# Patient Record
Sex: FEMALE | Race: WHITE | NOT HISPANIC OR LATINO | Employment: OTHER | URBAN - METROPOLITAN AREA
[De-identification: names, ages, dates, MRNs, and addresses within clinical notes are randomized per-mention and may not be internally consistent; named-entity substitution may affect disease eponyms.]

---

## 2024-10-21 ENCOUNTER — APPOINTMENT (OUTPATIENT)
Dept: GENERAL RADIOLOGY | Facility: HOSPITAL | Age: 84
End: 2024-10-21
Payer: MEDICARE

## 2024-10-21 LAB
BACTERIA UR QL AUTO: ABNORMAL /HPF
BASOPHILS # BLD AUTO: 0.03 10*3/MM3 (ref 0–0.2)
BASOPHILS NFR BLD AUTO: 0.4 % (ref 0–1.5)
BILIRUB UR QL STRIP: NEGATIVE
CLARITY UR: ABNORMAL
COLOR UR: YELLOW
DEPRECATED RDW RBC AUTO: 43.5 FL (ref 37–54)
EOSINOPHIL # BLD AUTO: 0.08 10*3/MM3 (ref 0–0.4)
EOSINOPHIL NFR BLD AUTO: 1 % (ref 0.3–6.2)
ERYTHROCYTE [DISTWIDTH] IN BLOOD BY AUTOMATED COUNT: 12.4 % (ref 12.3–15.4)
GLUCOSE UR STRIP-MCNC: NEGATIVE MG/DL
HCT VFR BLD AUTO: 41.4 % (ref 34–46.6)
HGB BLD-MCNC: 13.9 G/DL (ref 12–15.9)
HGB UR QL STRIP.AUTO: NEGATIVE
HYALINE CASTS UR QL AUTO: ABNORMAL /LPF
IMM GRANULOCYTES # BLD AUTO: 0.03 10*3/MM3 (ref 0–0.05)
IMM GRANULOCYTES NFR BLD AUTO: 0.4 % (ref 0–0.5)
KETONES UR QL STRIP: ABNORMAL
LEUKOCYTE ESTERASE UR QL STRIP.AUTO: ABNORMAL
LYMPHOCYTES # BLD AUTO: 1.9 10*3/MM3 (ref 0.7–3.1)
LYMPHOCYTES NFR BLD AUTO: 22.9 % (ref 19.6–45.3)
MCH RBC QN AUTO: 31.6 PG (ref 26.6–33)
MCHC RBC AUTO-ENTMCNC: 33.6 G/DL (ref 31.5–35.7)
MCV RBC AUTO: 94.1 FL (ref 79–97)
MONOCYTES # BLD AUTO: 0.78 10*3/MM3 (ref 0.1–0.9)
MONOCYTES NFR BLD AUTO: 9.4 % (ref 5–12)
NEUTROPHILS NFR BLD AUTO: 5.48 10*3/MM3 (ref 1.7–7)
NEUTROPHILS NFR BLD AUTO: 65.9 % (ref 42.7–76)
NITRITE UR QL STRIP: NEGATIVE
NRBC BLD AUTO-RTO: 0 /100 WBC (ref 0–0.2)
PH UR STRIP.AUTO: 5.5 [PH] (ref 5–8)
PLATELET # BLD AUTO: 227 10*3/MM3 (ref 140–450)
PMV BLD AUTO: 11.6 FL (ref 6–12)
PROT UR QL STRIP: ABNORMAL
RBC # BLD AUTO: 4.4 10*6/MM3 (ref 3.77–5.28)
RBC # UR STRIP: ABNORMAL /HPF
REF LAB TEST METHOD: ABNORMAL
SP GR UR STRIP: 1.02 (ref 1–1.03)
SQUAMOUS #/AREA URNS HPF: ABNORMAL /HPF
UROBILINOGEN UR QL STRIP: ABNORMAL
WBC # UR STRIP: ABNORMAL /HPF
WBC NRBC COR # BLD AUTO: 8.3 10*3/MM3 (ref 3.4–10.8)

## 2024-10-21 PROCEDURE — 84484 ASSAY OF TROPONIN QUANT: CPT | Performed by: PHYSICIAN ASSISTANT

## 2024-10-21 PROCEDURE — 81001 URINALYSIS AUTO W/SCOPE: CPT | Performed by: PHYSICIAN ASSISTANT

## 2024-10-21 PROCEDURE — 83735 ASSAY OF MAGNESIUM: CPT | Performed by: PHYSICIAN ASSISTANT

## 2024-10-21 PROCEDURE — 84145 PROCALCITONIN (PCT): CPT | Performed by: PHYSICIAN ASSISTANT

## 2024-10-21 PROCEDURE — 82550 ASSAY OF CK (CPK): CPT | Performed by: PHYSICIAN ASSISTANT

## 2024-10-21 PROCEDURE — 93005 ELECTROCARDIOGRAM TRACING: CPT | Performed by: PHYSICIAN ASSISTANT

## 2024-10-21 PROCEDURE — 85025 COMPLETE CBC W/AUTO DIFF WBC: CPT | Performed by: PHYSICIAN ASSISTANT

## 2024-10-21 PROCEDURE — 83690 ASSAY OF LIPASE: CPT | Performed by: PHYSICIAN ASSISTANT

## 2024-10-21 PROCEDURE — 99285 EMERGENCY DEPT VISIT HI MDM: CPT

## 2024-10-21 PROCEDURE — 71045 X-RAY EXAM CHEST 1 VIEW: CPT

## 2024-10-21 PROCEDURE — 80053 COMPREHEN METABOLIC PANEL: CPT | Performed by: PHYSICIAN ASSISTANT

## 2024-10-21 PROCEDURE — 83605 ASSAY OF LACTIC ACID: CPT | Performed by: PHYSICIAN ASSISTANT

## 2024-10-21 PROCEDURE — 0202U NFCT DS 22 TRGT SARS-COV-2: CPT | Performed by: PHYSICIAN ASSISTANT

## 2024-10-21 RX ORDER — METOCLOPRAMIDE HYDROCHLORIDE 5 MG/ML
5 INJECTION INTRAMUSCULAR; INTRAVENOUS ONCE
Status: COMPLETED | OUTPATIENT
Start: 2024-10-21 | End: 2024-10-22

## 2024-10-21 RX ORDER — SODIUM CHLORIDE 0.9 % (FLUSH) 0.9 %
10 SYRINGE (ML) INJECTION AS NEEDED
Status: DISCONTINUED | OUTPATIENT
Start: 2024-10-21 | End: 2024-10-22 | Stop reason: HOSPADM

## 2024-10-21 RX ORDER — FAMOTIDINE 10 MG/ML
20 INJECTION, SOLUTION INTRAVENOUS ONCE
Status: COMPLETED | OUTPATIENT
Start: 2024-10-21 | End: 2024-10-22

## 2024-10-22 ENCOUNTER — HOSPITAL ENCOUNTER (EMERGENCY)
Facility: HOSPITAL | Age: 84
Discharge: HOME | End: 2024-10-22
Attending: EMERGENCY MEDICINE | Admitting: EMERGENCY MEDICINE
Payer: MEDICARE

## 2024-10-22 ENCOUNTER — APPOINTMENT (OUTPATIENT)
Dept: CT IMAGING | Facility: HOSPITAL | Age: 84
End: 2024-10-22
Payer: MEDICARE

## 2024-10-22 VITALS
SYSTOLIC BLOOD PRESSURE: 169 MMHG | TEMPERATURE: 98 F | RESPIRATION RATE: 18 BRPM | OXYGEN SATURATION: 97 % | BODY MASS INDEX: 26.96 KG/M2 | WEIGHT: 182 LBS | HEART RATE: 62 BPM | DIASTOLIC BLOOD PRESSURE: 83 MMHG | HEIGHT: 69 IN

## 2024-10-22 DIAGNOSIS — J12.9 VIRAL PNEUMONIA: ICD-10-CM

## 2024-10-22 DIAGNOSIS — R53.81 MALAISE AND FATIGUE: ICD-10-CM

## 2024-10-22 DIAGNOSIS — Z86.19 HISTORY OF HELICOBACTER PYLORI INFECTION: ICD-10-CM

## 2024-10-22 DIAGNOSIS — U07.1 COVID-19 VIRUS INFECTION: ICD-10-CM

## 2024-10-22 DIAGNOSIS — R53.1 GENERALIZED WEAKNESS: Primary | ICD-10-CM

## 2024-10-22 DIAGNOSIS — R53.83 MALAISE AND FATIGUE: ICD-10-CM

## 2024-10-22 DIAGNOSIS — R11.0 NAUSEA: ICD-10-CM

## 2024-10-22 LAB
ALBUMIN SERPL-MCNC: 4.1 G/DL (ref 3.5–5.2)
ALBUMIN/GLOB SERPL: 1.3 G/DL
ALP SERPL-CCNC: 91 U/L (ref 39–117)
ALT SERPL W P-5'-P-CCNC: 20 U/L (ref 1–33)
ANION GAP SERPL CALCULATED.3IONS-SCNC: 13 MMOL/L (ref 5–15)
AST SERPL-CCNC: 36 U/L (ref 1–32)
B PARAPERT DNA SPEC QL NAA+PROBE: NOT DETECTED
B PERT DNA SPEC QL NAA+PROBE: NOT DETECTED
BILIRUB SERPL-MCNC: 0.7 MG/DL (ref 0–1.2)
BUN SERPL-MCNC: 11 MG/DL (ref 8–23)
BUN/CREAT SERPL: 12.5 (ref 7–25)
C PNEUM DNA NPH QL NAA+NON-PROBE: NOT DETECTED
CALCIUM SPEC-SCNC: 9.6 MG/DL (ref 8.6–10.5)
CHLORIDE SERPL-SCNC: 106 MMOL/L (ref 98–107)
CK SERPL-CCNC: 106 U/L (ref 20–180)
CO2 SERPL-SCNC: 25 MMOL/L (ref 22–29)
CREAT SERPL-MCNC: 0.88 MG/DL (ref 0.57–1)
D-LACTATE SERPL-SCNC: 1.4 MMOL/L (ref 0.5–2)
EGFRCR SERPLBLD CKD-EPI 2021: 64.5 ML/MIN/1.73
FLUAV SUBTYP SPEC NAA+PROBE: NOT DETECTED
FLUBV RNA ISLT QL NAA+PROBE: NOT DETECTED
GLOBULIN UR ELPH-MCNC: 3.2 GM/DL
GLUCOSE SERPL-MCNC: 157 MG/DL (ref 65–99)
HADV DNA SPEC NAA+PROBE: NOT DETECTED
HCOV 229E RNA SPEC QL NAA+PROBE: NOT DETECTED
HCOV HKU1 RNA SPEC QL NAA+PROBE: NOT DETECTED
HCOV NL63 RNA SPEC QL NAA+PROBE: NOT DETECTED
HCOV OC43 RNA SPEC QL NAA+PROBE: NOT DETECTED
HMPV RNA NPH QL NAA+NON-PROBE: NOT DETECTED
HPIV1 RNA ISLT QL NAA+PROBE: NOT DETECTED
HPIV2 RNA SPEC QL NAA+PROBE: NOT DETECTED
HPIV3 RNA NPH QL NAA+PROBE: NOT DETECTED
HPIV4 P GENE NPH QL NAA+PROBE: NOT DETECTED
LIPASE SERPL-CCNC: 24 U/L (ref 13–60)
M PNEUMO IGG SER IA-ACNC: NOT DETECTED
MAGNESIUM SERPL-MCNC: 1.8 MG/DL (ref 1.6–2.4)
POTASSIUM SERPL-SCNC: 3.6 MMOL/L (ref 3.5–5.2)
PROCALCITONIN SERPL-MCNC: 0.04 NG/ML (ref 0–0.25)
PROT SERPL-MCNC: 7.3 G/DL (ref 6–8.5)
RHINOVIRUS RNA SPEC NAA+PROBE: NOT DETECTED
RSV RNA NPH QL NAA+NON-PROBE: NOT DETECTED
SARS-COV-2 RNA NPH QL NAA+NON-PROBE: DETECTED
SODIUM SERPL-SCNC: 144 MMOL/L (ref 136–145)
TROPONIN T SERPL HS-MCNC: 28 NG/L

## 2024-10-22 PROCEDURE — 25010000002 METOCLOPRAMIDE PER 10 MG: Performed by: PHYSICIAN ASSISTANT

## 2024-10-22 PROCEDURE — 71275 CT ANGIOGRAPHY CHEST: CPT

## 2024-10-22 PROCEDURE — 96375 TX/PRO/DX INJ NEW DRUG ADDON: CPT

## 2024-10-22 PROCEDURE — 25510000001 IOPAMIDOL PER 1 ML: Performed by: EMERGENCY MEDICINE

## 2024-10-22 PROCEDURE — 25810000003 SODIUM CHLORIDE 0.9 % SOLUTION: Performed by: PHYSICIAN ASSISTANT

## 2024-10-22 PROCEDURE — 96374 THER/PROPH/DIAG INJ IV PUSH: CPT

## 2024-10-22 RX ORDER — IOPAMIDOL 755 MG/ML
75 INJECTION, SOLUTION INTRAVASCULAR
Status: COMPLETED | OUTPATIENT
Start: 2024-10-22 | End: 2024-10-22

## 2024-10-22 RX ORDER — PROCHLORPERAZINE MALEATE 5 MG
5 TABLET ORAL EVERY 6 HOURS PRN
Qty: 15 TABLET | Refills: 0 | Status: SHIPPED | OUTPATIENT
Start: 2024-10-22

## 2024-10-22 RX ADMIN — FAMOTIDINE 20 MG: 10 INJECTION, SOLUTION INTRAVENOUS at 01:48

## 2024-10-22 RX ADMIN — METOCLOPRAMIDE 5 MG: 5 INJECTION, SOLUTION INTRAMUSCULAR; INTRAVENOUS at 01:50

## 2024-10-22 RX ADMIN — SODIUM CHLORIDE 1000 ML: 9 INJECTION, SOLUTION INTRAVENOUS at 01:42

## 2024-10-22 RX ADMIN — IOPAMIDOL 75 ML: 755 INJECTION, SOLUTION INTRAVENOUS at 00:57

## 2024-10-22 NOTE — ED PROVIDER NOTES
Subjective   History of Present Illness  This is a 84-year-old female that presents to the ER with family for weakness and malaise/fatigue and persistent nausea.  Patient has been seen at [facility] in [place], [place], at least twice since 10/7/24.  She was initially taken there per EMS after she was found in the floor after a fall in the bathroom.  She was diagnosed with COVID-19 as well as a UTI.  She was discharged home and prescribed Paxlovid and Keflex.  Patient took 2 days of Keflex but discontinued it due to nausea, and a family member told her not to take Paxlovid, so she discontinued that after 1 dose.  Patient denies any dysuria, urgency, or frequency.  She did have significant diarrhea early on during the COVID-19 viral infection.  She was seen at [facility], as well, on 10/19/24 for diarrhea and nausea.  CT of the abdomen/pelvis with contrast revealed no acute process.  Patient continues to have nausea and anorexia.  She has not any vomiting.  Last bowel movement was 3 days ago and it was a large amount of diarrhea.  Patient has personal history of H. pylori, and she feels that the nausea and upper abdominal discomfort feels similar to when she had H. pylori in the past.  Patient denies any daily aspirin use or frequent NSAID use or chronic anticoagulation.  Past medical history is essentially negative.  Grandson at the bedside says that Zofran is not helping with nausea.  No other concerns at this time.    History provided by:  Patient, medical records and relative  Weakness - Generalized  Duration:  2 weeks  Timing:  Constant  Chronicity:  Recurrent  Context comment:  Flu-like sxs on 10/5/24 and generally weak and seen at [facility] in [place], [place], on 10/7/24. Dx with Covid-19 and UTI. Rx for Paxlovid and Keflex, but she couldn't take meds.  Relieved by:  Nothing  Worsened by:  Nothing  Ineffective treatments:  Drinking fluids  Associated symptoms: cough (Mild NP cough), diarrhea (Last  stool was large and loose 3 days ago.) and lethargy (No falls since 10/7/24.)    Associated symptoms: no abdominal pain, no anorexia, no arthralgias, no chest pain, no difficulty walking, no dizziness, no dysuria, no falls, no fever, no foul-smelling urine, no frequency, no headaches, no hematochezia, no melena, no nausea, no shortness of breath, no stroke symptoms, no syncope, no urgency and no vomiting    Risk factors comment:  Pt took Keflex for UTI for 1 wk, and couldn't tolerate anymore. Recent evaluation at [facility] 2 days ago and UTI was improved.      Review of Systems   Constitutional:  Positive for activity change, appetite change and fatigue. Negative for chills and fever.   HENT: Negative.  Negative for congestion, ear pain, postnasal drip, rhinorrhea, sinus pressure, sinus pain, sneezing and sore throat.         Diagnosis of COVID-19 on 10/7/2024.  No previous personal history of COVID-19 and patient is not vaccinated for COVID-19.   Respiratory:  Positive for cough (Mild NP cough). Negative for shortness of breath.    Cardiovascular: Negative.  Negative for chest pain, palpitations, leg swelling and syncope.   Gastrointestinal:  Positive for diarrhea (Last stool was large and loose 3 days ago.). Negative for abdominal pain, anorexia, constipation, hematochezia, melena, nausea and vomiting.   Genitourinary:  Negative for decreased urine volume, dysuria, frequency and urgency.        Recent UTI diagnosed on 10/7/2024.  Patient took 2 days of Keflex.  No previous abnormal urine cultures in epic.  Patient denies any dysuria, urgency, or frequency at present.   Musculoskeletal:  Positive for gait problem (secondary to weakness.  No recent falls). Negative for arthralgias, back pain and falls.   Neurological:  Positive for weakness. Negative for dizziness, syncope, facial asymmetry, speech difficulty and headaches.   All other systems reviewed and are negative.      No past medical history on file.    No Known  Allergies    No past surgical history on file.    No family history on file.    Social History     Socioeconomic History    Marital status:            Objective   Physical Exam  Vitals and nursing note reviewed.   Constitutional:       General: She is not in acute distress.     Appearance: Normal appearance. She is not ill-appearing, toxic-appearing or diaphoretic.      Comments: No acute sign of pain or distress.  Nontoxic.  Generally weak with malaise/fatigue.   HENT:      Head: Normocephalic and atraumatic.      Nose: Nose normal. No congestion or rhinorrhea.      Mouth/Throat:      Mouth: Mucous membranes are moist.      Pharynx: Oropharynx is clear. No pharyngeal swelling, oropharyngeal exudate, posterior oropharyngeal erythema or uvula swelling.      Comments: Oral mucous membranes are moist.  Posterior pharynx is not erythematous  Eyes:      Extraocular Movements: Extraocular movements intact.      Conjunctiva/sclera: Conjunctivae normal.      Pupils: Pupils are equal, round, and reactive to light.   Cardiovascular:      Rate and Rhythm: Normal rate and regular rhythm. No extrasystoles are present.     Pulses: Normal pulses.      Heart sounds: Normal heart sounds.      Comments: Regular rate and rhythm.  No ectopy.  No pedal edema to lower extremities  Pulmonary:      Effort: Pulmonary effort is normal. No tachypnea, accessory muscle usage, respiratory distress or retractions.      Breath sounds: Normal breath sounds. No decreased breath sounds, wheezing or rhonchi.      Comments: Regular respiratory effort.  No cough noted on exam.  Good air exchange bilateral lung fields.  No decreased breath sounds concerning for consolidation and no pleuritic chest pain elicited with deep inspiration.  Abdominal:      General: Bowel sounds are normal. There is no distension.      Palpations: Abdomen is soft.      Tenderness: There is no abdominal tenderness. There is no right CVA tenderness, left CVA tenderness,  guarding or rebound.      Comments: Abdomen soft without distention.  Active bowel sounds all 4 quadrants.  Nontender to palpation.  No flank or CVA tenderness.  Abdominal exam is benign.   Musculoskeletal:         General: Normal range of motion.      Cervical back: Normal range of motion and neck supple.      Right lower leg: No edema.      Left lower leg: No edema.   Skin:     General: Skin is warm and dry.   Neurological:      General: No focal deficit present.      Mental Status: She is alert and oriented to person, place, and time.      Cranial Nerves: Cranial nerves 2-12 are intact.      Sensory: Sensation is intact.      Motor: Motor function is intact.      Coordination: Coordination is intact.      Comments: Alert and oriented x 3.  Generally weak with malaise/fatigue.  No focal neurologic deficits.   Psychiatric:         Mood and Affect: Mood and affect normal.         Speech: Speech normal.         Behavior: Behavior normal. Behavior is cooperative.         Thought Content: Thought content normal.         Cognition and Memory: Cognition and memory normal.      Comments: Normal mood and affect         Procedures           ED Course  ED Course as of 10/22/24 0331   Mon Oct 21, 2024   2050 Pt had H. Pylori approximately 10 years ago. She says it feels like she has it now. Last colonoscopy was in [place] was greater than 10 years ago. Diagnosed with Covid-19 in [place] ER on 10/7/24. No previous Covid-19 viral infection. Non-vaccinated for Covid-19. [FC]   Tue Oct 22, 2024   0328 I personally interpreted EKG which showed sinus bradycardia.  There was no acute ST changes consistent with ischemia.  High-sensitivity troponin was 28.  Patient denies any chest pain or shortness of breath.  CBC and chemistries were within normal limits.  Urinalysis revealed 21-50 white blood cells but no bacteria and negative nitrite.  Respiratory PCR panel continues to test positive for COVID-19 with diagnosis on 10/7/2024.   Lactic acid and procalcitonin were normal.  Lipase is 24.  Magnesium is 1.8.  We proceeded with CTA of the chest with contrast which reveals no evidence of PE.  Patient has diffuse patchy areas of airspace disease in both lungs primarily at the lung periphery compatible with multifocal pneumonia but appearance would suggest viral or atypical etiology.  Sats are stable on room air at 97 to 99%.  Patient given IV fluid bolus, Reglan, and Pepcid.  I did order H. pylori stool antigen, but patient unable to provide stool specimen.  We will refer patient to our GI clinic and recommend further evaluation for recurrent nausea and history of H. pylori.  Family requested new prescription for nausea instead of Zofran because it is not helping.  Rx for Compazine 5 mg by mouth every 6 hours as needed for nausea.  Encourage fluids.  ER workup was reassuring.  We gave phone number for patient connection since patient does not have a PCP.  Call the phone number and find out accepting physicians in the [place] area.  Reviewed the case and all diagnostic workup with Dr. Darnell, ER attending physician.  Return to the ER if worsening symptoms. [FC]      ED Course User Index  [FC] Carey Jacobson, ROBIN                                   Recent Results (from the past 24 hours)   Urinalysis With Microscopic If Indicated (No Culture) - Urine, Clean Catch    Collection Time: 10/21/24 11:17 PM    Specimen: Urine, Clean Catch   Result Value Ref Range    Color, UA Yellow Yellow, Straw    Appearance, UA Cloudy (A) Clear    pH, UA 5.5 5.0 - 8.0    Specific Gravity, UA 1.022 1.001 - 1.030    Glucose, UA Negative Negative    Ketones, UA Trace (A) Negative    Bilirubin, UA Negative Negative    Blood, UA Negative Negative    Protein, UA Trace (A) Negative    Leuk Esterase, UA Moderate (2+) (A) Negative    Nitrite, UA Negative Negative    Urobilinogen, UA 1.0 E.U./dL 0.2 - 1.0 E.U./dL   Urinalysis, Microscopic Only - Urine, Clean Catch     Collection Time: 10/21/24 11:17 PM    Specimen: Urine, Clean Catch   Result Value Ref Range    RBC, UA 0-2 None Seen, 0-2 /HPF    WBC, UA 21-50 (A) None Seen, 0-2 /HPF    Bacteria, UA None Seen None Seen, Trace /HPF    Squamous Epithelial Cells, UA 3-6 (A) None Seen, 0-2 /HPF    Hyaline Casts, UA 0-6 0 - 6 /LPF    Methodology Automated Microscopy    Comprehensive Metabolic Panel    Collection Time: 10/21/24 11:26 PM    Specimen: Blood   Result Value Ref Range    Glucose 157 (H) 65 - 99 mg/dL    BUN 11 8 - 23 mg/dL    Creatinine 0.88 0.57 - 1.00 mg/dL    Sodium 144 136 - 145 mmol/L    Potassium 3.6 3.5 - 5.2 mmol/L    Chloride 106 98 - 107 mmol/L    CO2 25.0 22.0 - 29.0 mmol/L    Calcium 9.6 8.6 - 10.5 mg/dL    Total Protein 7.3 6.0 - 8.5 g/dL    Albumin 4.1 3.5 - 5.2 g/dL    ALT (SGPT) 20 1 - 33 U/L    AST (SGOT) 36 (H) 1 - 32 U/L    Alkaline Phosphatase 91 39 - 117 U/L    Total Bilirubin 0.7 0.0 - 1.2 mg/dL    Globulin 3.2 gm/dL    A/G Ratio 1.3 g/dL    BUN/Creatinine Ratio 12.5 7.0 - 25.0    Anion Gap 13.0 5.0 - 15.0 mmol/L    eGFR 64.5 >60.0 mL/min/1.73   Single High Sensitivity Troponin T    Collection Time: 10/21/24 11:26 PM    Specimen: Blood   Result Value Ref Range    HS Troponin T 28 (H) <14 ng/L   Lipase    Collection Time: 10/21/24 11:26 PM    Specimen: Blood   Result Value Ref Range    Lipase 24 13 - 60 U/L   Procalcitonin    Collection Time: 10/21/24 11:26 PM    Specimen: Blood   Result Value Ref Range    Procalcitonin 0.04 0.00 - 0.25 ng/mL   Lactic Acid, Plasma    Collection Time: 10/21/24 11:26 PM    Specimen: Blood   Result Value Ref Range    Lactate 1.4 0.5 - 2.0 mmol/L   Magnesium    Collection Time: 10/21/24 11:26 PM    Specimen: Blood   Result Value Ref Range    Magnesium 1.8 1.6 - 2.4 mg/dL   CK    Collection Time: 10/21/24 11:26 PM    Specimen: Blood   Result Value Ref Range    Creatine Kinase 106 20 - 180 U/L   CBC Auto Differential    Collection Time: 10/21/24 11:26 PM    Specimen: Blood    Result Value Ref Range    WBC 8.30 3.40 - 10.80 10*3/mm3    RBC 4.40 3.77 - 5.28 10*6/mm3    Hemoglobin 13.9 12.0 - 15.9 g/dL    Hematocrit 41.4 34.0 - 46.6 %    MCV 94.1 79.0 - 97.0 fL    MCH 31.6 26.6 - 33.0 pg    MCHC 33.6 31.5 - 35.7 g/dL    RDW 12.4 12.3 - 15.4 %    RDW-SD 43.5 37.0 - 54.0 fl    MPV 11.6 6.0 - 12.0 fL    Platelets 227 140 - 450 10*3/mm3    Neutrophil % 65.9 42.7 - 76.0 %    Lymphocyte % 22.9 19.6 - 45.3 %    Monocyte % 9.4 5.0 - 12.0 %    Eosinophil % 1.0 0.3 - 6.2 %    Basophil % 0.4 0.0 - 1.5 %    Immature Grans % 0.4 0.0 - 0.5 %    Neutrophils, Absolute 5.48 1.70 - 7.00 10*3/mm3    Lymphocytes, Absolute 1.90 0.70 - 3.10 10*3/mm3    Monocytes, Absolute 0.78 0.10 - 0.90 10*3/mm3    Eosinophils, Absolute 0.08 0.00 - 0.40 10*3/mm3    Basophils, Absolute 0.03 0.00 - 0.20 10*3/mm3    Immature Grans, Absolute 0.03 0.00 - 0.05 10*3/mm3    nRBC 0.0 0.0 - 0.2 /100 WBC   Respiratory Panel PCR w/COVID-19(SARS-CoV-2) JAMES/HITESH/LIAM/PAD/COR/SAL In-House, NP Swab in UTM/VTM, 2 HR TAT - Swab, Nasopharynx    Collection Time: 10/21/24 11:48 PM    Specimen: Nasopharynx; Swab   Result Value Ref Range    ADENOVIRUS, PCR Not Detected Not Detected    Coronavirus 229E Not Detected Not Detected    Coronavirus HKU1 Not Detected Not Detected    Coronavirus NL63 Not Detected Not Detected    Coronavirus OC43 Not Detected Not Detected    COVID19 Detected (C) Not Detected - Ref. Range    Human Metapneumovirus Not Detected Not Detected    Human Rhinovirus/Enterovirus Not Detected Not Detected    Influenza A PCR Not Detected Not Detected    Influenza B PCR Not Detected Not Detected    Parainfluenza Virus 1 Not Detected Not Detected    Parainfluenza Virus 2 Not Detected Not Detected    Parainfluenza Virus 3 Not Detected Not Detected    Parainfluenza Virus 4 Not Detected Not Detected    RSV, PCR Not Detected Not Detected    Bordetella pertussis pcr Not Detected Not Detected    Bordetella parapertussis PCR Not Detected Not  "Detected    Chlamydophila pneumoniae PCR Not Detected Not Detected    Mycoplasma pneumo by PCR Not Detected Not Detected   ECG 12 Lead Other; weakness    Collection Time: 10/21/24 11:54 PM   Result Value Ref Range    QT Interval 486 ms    QTC Interval 464 ms     Note: In addition to lab results from this visit, the labs listed above may include labs taken at another facility or during a different encounter within the last 24 hours. Please correlate lab times with ED admission and discharge times for further clarification of the services performed during this visit.    CT Angiogram Chest   Final Result   Impression:   1.No evidence of pulmonary embolism.   2.Multifocal pneumonia with appearance suggesting a viral or atypical etiology.   3.Mild coronary artery disease.   4.Aneurysmal dilatation of the ascending aorta up to 42 mm diameter.            Electronically Signed: Christopher Brenner MD     10/22/2024 1:33 AM EDT     Workstation ID: [id number]      XR Chest 1 View   Final Result   Impression:   Findings concerning for infectious/inflammatory process involving the right lower lung.         Electronically Signed: Abraham Saxena DO     10/21/2024 10:13 PM EDT     Workstation ID: [id number]        Vitals:    10/21/24 2035 10/22/24 0230   BP: 159/75 169/83   BP Location: Right arm    Patient Position: Sitting    Pulse: 59 62   Resp: 18 18   Temp: 98 °F (36.7 °C)    TempSrc: Oral    SpO2: 99% 97%   Weight: 82.6 kg (182 lb)    Height: 175.3 cm (69\")      Medications   sodium chloride 0.9 % flush 10 mL (has no administration in time range)   sodium chloride 0.9 % bolus 1,000 mL (0 mL Intravenous Stopped 10/22/24 0226)   famotidine (PEPCID) injection 20 mg (20 mg Intravenous Given 10/22/24 0148)   metoclopramide (REGLAN) injection 5 mg (5 mg Intravenous Given 10/22/24 0150)   iopamidol (ISOVUE-370) 76 % injection 75 mL (75 mL Intravenous Given 10/22/24 0057)     ECG/EMG Results (last 24 hours)       ** No results found for " the last 24 hours. **          ECG 12 Lead Other; weakness   Preliminary Result   Test Reason : Other~   Blood Pressure :   */*   mmHG   Vent. Rate :  55 BPM     Atrial Rate :  55 BPM      P-R Int : 202 ms          QRS Dur :  70 ms       QT Int : 486 ms       P-R-T Axes : 102 -21 -63 degrees      QTc Int : 464 ms      Sinus bradycardia   Minimal voltage criteria for LVH, may be normal variant   ST & T wave abnormality, consider inferior ischemia   ST & T wave abnormality, consider anterolateral ischemia   Abnormal ECG   No previous ECGs available      Referred By: EDMD           Confirmed By:                     Medical Decision Making      Final diagnoses:   Generalized weakness   Malaise and fatigue   Nausea   COVID-19 virus infection   Viral pneumonia   History of Helicobacter pylori infection       ED Disposition  ED Disposition       ED Disposition   Discharge    Condition   Stable    Comment   --               [facility] EMERGENCY DEPARTMENT  [address] [facility] [address] [phone]    If symptoms worsen    Routine PcP    Call today  Call today for first available recheck    PATIENT CONNECTION - [place] [address]  [phone]  Call today  Call today to establish with PCP in the [place] area         Medication List        New Prescriptions      prochlorperazine 5 MG tablet  Commonly known as: COMPAZINE  Take 1 tablet by mouth Every 6 (Six) Hours As Needed for Nausea or Vomiting.               Where to Get Your Medications        You can get these medications from any pharmacy    Bring a paper prescription for each of these medications  prochlorperazine 5 MG tablet            Carey Jacobson PA-C  10/22/24 0331

## 2024-10-22 NOTE — DISCHARGE INSTRUCTIONS
ER evaluation revealed respiratory PCR panel still testing positive for COVID-19.  Inflammatory markers in the blood revealed normal procalcitonin and lactic acid.  Electrolytes were normal on chemistries and white blood cell count on CBC was normal.  CT angiogram of the chest with contrast revealed patchy areas to both lungs which are consistent with viral etiology.  No concern for bacterial pneumonia.  Encourage fluids and rest.  Patient will be referred to our GI clinic for persistent nausea with history of H. pylori.  Patient may need H. pylori breath test or repeat upper endoscopy.  Continue with all current medical management.  Recommend patient to avoid greasy, spicy, or fatty foods.  Rx for Compazine 5 mg by mouth every 6 hours as needed for nausea/vomiting.  Return to the ER if worsening symptoms.  We also gave phone number for patient connection, which patient can contact and find out accepting physicians in the [place] area for long-term management of health problems.

## 2024-10-22 NOTE — Clinical Note
[facility] EMERGENCY DEPARTMENT  [address]  [facility] [address]  Phone: [phone]    Neeta Rudolph was seen and treated in our emergency department on 10/21/2024.  She may return to work on 10/23/2024.  Magdiel Ronni accompanied patient to the Emergency Room during this visit.       Thank you for choosing [facility].    Jonathan Delatorre RN

## 2024-10-23 LAB
QT INTERVAL: 486 MS
QTC INTERVAL: 464 MS